# Patient Record
Sex: MALE | Race: WHITE
[De-identification: names, ages, dates, MRNs, and addresses within clinical notes are randomized per-mention and may not be internally consistent; named-entity substitution may affect disease eponyms.]

---

## 2017-02-04 NOTE — ER DOCUMENT REPORT
ED General





- General


Chief Complaint: Flu Symptoms


Stated Complaint: COUGH


Notes: 


Patient is 51-year-old male presents with complaint of coughing, chills, and 

vomiting 2.  No abdominal pain.  No diarrhea.  He says he may have had some 

sick contacts at work but is unsure.  No chest pain.  Mild sore throat.  No ear 

pain.


TRAVEL OUTSIDE OF THE [place] IN LAST 30 DAYS: No





- Related Data


Allergies/Adverse Reactions: 


 





No Known Allergies Allergy (Verified 10/04/16 17:00)


 











Past Medical History





- Social History


Smoking Status: Unknown if Ever Smoked


Frequency of alcohol use: None


Drug Abuse: None


Family History: Reviewed & Not Pertinent


Renal/ Medical History: Denies: Hx Peritoneal Dialysis


Past Surgical History: Reports: Hx Cardiac Surgery, Hx Cholecystectomy, Hx 

Orthopedic Surgery - right wrist





- Immunizations


Immunizations up to date: Yes


Hx Diphtheria, Pertussis, Tetanus Vaccination: Yes





Review of Systems





- Review of Systems


Notes: 


My Normal Review Basic





REVIEW OF SYSTEMS:


CONSTITUTIONAL : Chills and body aches


EENT:   Denies eye, ear, throat, or mouth pain or symptoms.  Denies nasal or 

sinus congestion.


CARDIOVASCULAR:  Denies chest pain.


RESPIRATORY: Recurrent cough.


GASTROINTESTINAL:  Denies abdominal pain.  Vomiting twice.  Denies 

constipation.  Last BM: 


MUSCULOSKELETAL:  Denies neck or back pain or joint pain or swelling.


SKIN:   Denies rash or skin lesions.ds.


NEUROLOGICAL:  Denies altered mental status or loss of consciousness.  Denies 

headache.  Denies weakness or paralysis or loss of use of either side.  Denies 

problems with gait or speech.  Denies sensory or motor loss.


ALL OTHER SYSTEMS REVIEWED AND NEGATIVE.





Physical Exam





- Vital signs


Vitals: 


 











Temp Resp BP Pulse Ox


 


 98.2 F   16   131/79 H  95 


 


 02/04/17 20:55  02/04/17 20:55  02/04/17 20:55  02/04/17 20:55














- Notes


Notes: 


General Appearance: Well nourished, alert, cooperative, no acute distress, no 

obvious discomfort.  Well-appearing.


Vitals: reviewed, See vital signs table.


Head: no swelling or tenderness to the head


Eyes: PERRL, EOMI, Conjuctiva clear


Mouth: No decreasd moisture


Throat: No tonsillar inflammation, No airway obstruction,  No lymphadenopathy


Ears: Normal appearing tympanic membranes bilaterally.


Neck: Supple, no neck tenderness, No thyromegaly


Lungs: No wheezing, No rales, No rhonci, No accessory muscle use, good air 

exchange bilaterally.


Heart: Normal rate, Regular rythm, No murmur, no rub


Abdomen: Normal BS, soft, No rigidity, No abdominal tenderness, No guarding, no 

rebound, no abdominal masses, no organomegaly


Extremities: strength 5/5 in all extremities, good pulses in all extremities, 

no swelling or tenderness in the extremities, no edema.


Skin: warm, dry, appropriate color, no rash


Neuro: speech clear, oriented x 3, normal affect, responds appropriately to 

questions.





Course





- Vital Signs


Vital signs: 


 











Temp Pulse Resp BP Pulse Ox


 


 97.6 F   58 L  16   127/78 H  97 


 


 02/05/17 02:35  02/05/17 02:35  02/05/17 02:35  02/05/17 02:35  02/05/17 02:35














- Transfer of Care


Notes: 





02/05/17 07:02


Chest x-ray is negative for pneumonia.  Patient looks very well.  A fairly safe 

to be discharged home.  I'll discharge him home with Zofran for his nausea.  I 

encouraged return to ER immediately if he has difficulty breathing, high fevers

, or appears unwell.





Dictation of this chart was performed using voice recognition software; 

therefore, there may be some unintended grammatical errors.





Discharge





- Discharge


Clinical Impression: 


URI (upper respiratory infection)


Qualifiers:


 URI type: unspecified URI Qualified Code(s): J06.9 - Acute upper respiratory 

infection, unspecified





Condition: Good


Disposition: HOME, SELF-CARE


Additional Instructions: 


Chest x-ray was negative for pneumonia.  I prescribed a nausea medicine in case 

you have recurrent nausea or any vomiting.  I suspect that your illness is 

probably related to a virus.  Despite you having a negative chest x-ray today, 

is still extremely important to return to ER immediately if you have difficulty 

breathing, worsening cough, or recurrent fevers.  Please follow-up with your 

doctor to 3 days for reevaluation.  If unable to follow up with her doctor you'

re more than welcome to return here for reevaluation.


Forms:  Return to Work

## 2017-02-04 NOTE — ER DOCUMENT REPORT
ED Medical Screen (RME)





- General


Stated Complaint: COUGH


Notes: 


Patient states he has been coughing for 2 days.  States he vomited today 2, no 

diarrhea.  States he has chills.  Vital signs normal in RME.  Pain level 4 due 

to sore throat.





I have greeted and performed a rapid initial assessment of this patient.  A 

comprehensive ED assessment and evaluation of the patient, analysis of test 

results and completion of the medical decision making process will be conducted 

by additional ED providers.


TRAVEL OUTSIDE OF THE [place] IN LAST 30 DAYS: No





- Related Data


Allergies/Adverse Reactions: 


 





No Known Allergies Allergy (Verified 10/04/16 17:00)


 











Past Medical History


Past Surgical History: Reports: Hx Cardiac Surgery, Hx Cholecystectomy, Hx 

Orthopedic Surgery - right wrist





- Immunizations


Immunizations up to date: Yes


Hx Diphtheria, Pertussis, Tetanus Vaccination: Yes





Physical Exam





- Vital signs


Vitals: 





 











Temp Resp BP Pulse Ox


 


 98.2 F   16   131/79 H  95 


 


 02/04/17 20:55  02/04/17 20:55  02/04/17 20:55  02/04/17 20:55














Course





- Vital Signs


Vital signs: 





 











Temp Pulse Resp BP Pulse Ox


 


 98.2 F      16   131/79 H  95 


 


 02/04/17 20:55     02/04/17 20:55  02/04/17 20:55  02/04/17 20:55

## 2017-12-13 ENCOUNTER — HOSPITAL ENCOUNTER (EMERGENCY)
Dept: HOSPITAL 62 - ER | Age: 52
Discharge: HOME | End: 2017-12-13
Payer: COMMERCIAL

## 2017-12-13 VITALS — SYSTOLIC BLOOD PRESSURE: 128 MMHG | DIASTOLIC BLOOD PRESSURE: 79 MMHG

## 2017-12-13 DIAGNOSIS — R50.9: ICD-10-CM

## 2017-12-13 DIAGNOSIS — R05: Primary | ICD-10-CM

## 2017-12-13 PROCEDURE — 71020: CPT

## 2017-12-13 PROCEDURE — 87804 INFLUENZA ASSAY W/OPTIC: CPT

## 2017-12-13 PROCEDURE — 94640 AIRWAY INHALATION TREATMENT: CPT

## 2017-12-13 PROCEDURE — 99283 EMERGENCY DEPT VISIT LOW MDM: CPT

## 2017-12-13 NOTE — ER DOCUMENT REPORT
HPI





- HPI


Pain Level: 3


Context: 





Patient is a 52-year-old male who presents emergency department complaining of 

flulike symptoms for the past days.  Admits to a T-max of 100 that was noted 

yesterday.  Otherwise denies any nausea, vomiting, abdominal pain diarrhea or 

constipation.  Main complaint is body aches with productive cough.  Patient is 

a non-smoker otherwise healthy male.  Does not have a primary care doctor





- CONSTITUTIONAL


Constitutional: REPORTS: Fever, Chills





- EENT


EENT: REPORTS: Sore Throat, Ear Pain.  DENIES: Eye problems





- CARDIOVASCULAR


Cardiovascular: REPORTS: Chest pain





- RESPIRATORY


Respiratory: REPORTS: Coughing - productive white sputum.  DENIES: Trouble 

Breathing





- REPRODUCTIVE


Reproductive: DENIES: Pregnant:





Past Medical History





- Social History


Smoking Status: Never Smoker


Chew tobacco use (# tins/day): No


Frequency of alcohol use: None


Drug Abuse: None


Family History: Reviewed & Not Pertinent


Patient has suicidal ideation: No


Patient has homicidal ideation: No


Renal/ Medical History: Denies: Hx Peritoneal Dialysis


Past Surgical History: Reports: Hx Cardiac Surgery, Hx Cholecystectomy, Hx 

Orthopedic Surgery - right wrist





- Immunizations


Immunizations up to date: Yes


Hx Diphtheria, Pertussis, Tetanus Vaccination: Yes





Vertical Provider Document





- CONSTITUTIONAL


Agree With Documented VS: Yes


Notes: 





PHYSICAL EXAM


GENERAL: Alert, interacts well. 


HEAD: Normocephalic, atraumatic.


EYES: Pupils equal, round, and reactive to light. Extraocular movements intact.


ENT: Oral mucosa moist, tongue midline. 


NECK: Full range of motion. Supple. Trachea midline.


LUNGS: Diminished breath sounds in the right lower lobe with audible crackles 

without wheezes, rales, or rhonchi. No respiratory distress.


HEART: Regular rate and rhythm. No murmurs, gallops, or rubs.


ABDOMEN: Soft,  nondistended, nontender. No guarding, rebound, or rigidity.. 

Bowel sounds present in all 4 quadrants.


EXTREMITIES: Moves all 4 extremities spontaneously. No edema, radial and 

dorsalis pedis pulses 2/4 bilaterally. No cyanosis. 


NEUROLOGICAL: Alert and oriented x4. Normal speech.


PSYCH: Normal affect, normal mood.


SKIN: Warm, dry, normal turgor. No rashes or lesions noted.








- INFECTION CONTROL


TRAVEL OUTSIDE OF THE [place] IN LAST 30 DAYS: No





- RESPIRATORY


O2 Sat by Pulse Oximetry: 97





Course





- Re-evaluation


Re-evalutation: 





12/13/17 10:14


Patient is a 52-year-old male who is hemodynamically stable, no acute distress 

and afebrile.  Patient states he feels much better after breathing treatment.  

Chest x-ray without any evidence of pneumonia.  Presentation is most consistent 

with a viral upper respiratory infection.  Patient is overall well appearance, 

vitals within normal limits, well-hydrated.  Patient denies any headache, neck 

pain, and has no evidence of meningismus on examination.  Lungs are clear 

bilaterally.  No evidence of respiratory distress.  Based on clinical exam and 

history, I do not suspect an acute pneumonia, meningitis, strep pharyngitis, or 

an acute encephalitis.  No laboratory or imaging testing is indicated at this 

time.  Will discharge patient with return precautions and followup 

recommendations.  They are in agreement this plan have verbalized understanding 

return precautions.








- Vital Signs


Vital signs: 


 











Temp Pulse Resp BP Pulse Ox


 


 98.8 F   74   14   124/77   97 


 


 12/13/17 07:41  12/13/17 07:41  12/13/17 07:41  12/13/17 07:41  12/13/17 07:41














- Diagnostic Test


Radiology reviewed: Image reviewed, Reports reviewed





Discharge





- Discharge


Clinical Impression: 


 Cough





Condition: Good


Disposition: HOME, SELF-CARE


Additional Instructions: 


Your symptoms are most likely due to a viral infection it should resolve over 

the next 7-14 days.  You should take over-the-counter guanfacine per bottle 

instructions to help thin the mucus. For nasal congestion: I would recommend 

that you get over-the-counter oxymetazoline also known is afrin.  Use only per 

bottle instructions and be sure to never use this for more than 3 days if you 

can develop severe rebound congestion. You may also use tylenol or ibuprofen as 

needed for aches and throat discomfort. Please be sure to drink plenty of 

fluids and get rest. Return to the emergency department he began having 

difficulty breathing, chest pain, persistent vomiting, or any other symptoms 

that are concerning to you. 


Prescriptions: 


Albuterol Sulfate [Proair HFA Inhalation Aerosol 8.5 gm MDI] 2 puff IH Q4H PRN #

1 mdi


 PRN Reason: 


Forms:  Return to Work

## 2017-12-13 NOTE — RADIOLOGY REPORT (SQ)
EXAM DESCRIPTION:  CHEST PA/LAT



COMPLETED DATE/TIME:  12/13/2017 9:41 am



REASON FOR STUDY:  diminished BS RLL with crackles, cough



COMPARISON:  2/5/2017



EXAM PARAMETERS:  NUMBER OF VIEWS: two views

TECHNIQUE: Digital Frontal and Lateral radiographic views of the chest acquired.

RADIATION DOSE: NA

LIMITATIONS: none



FINDINGS:  LUNGS AND PLEURA: Subsegmental atelectasis or scarring in the left lower lobe.  Right lung
 is clear.  No effusions.

MEDIASTINUM AND HILAR STRUCTURES: No masses or contour abnormalities.

HEART AND VASCULAR STRUCTURES: Heart normal size.  No evidence for failure.

BONES: No acute findings.

HARDWARE: None in the chest.

OTHER: No other significant finding.



IMPRESSION:  Left basilar atelectasis.



TECHNICAL DOCUMENTATION:  JOB ID:  [id number]

 2011 [employer]- All Rights Reserved

## 2017-12-14 ENCOUNTER — HOSPITAL ENCOUNTER (EMERGENCY)
Dept: HOSPITAL 62 - ER | Age: 52
Discharge: HOME | End: 2017-12-14
Payer: COMMERCIAL

## 2017-12-14 VITALS — DIASTOLIC BLOOD PRESSURE: 81 MMHG | SYSTOLIC BLOOD PRESSURE: 133 MMHG

## 2017-12-14 DIAGNOSIS — T48.6X6A: ICD-10-CM

## 2017-12-14 DIAGNOSIS — R06.2: ICD-10-CM

## 2017-12-14 DIAGNOSIS — Z91.14: ICD-10-CM

## 2017-12-14 DIAGNOSIS — Z91.128: ICD-10-CM

## 2017-12-14 DIAGNOSIS — J06.9: Primary | ICD-10-CM

## 2017-12-14 DIAGNOSIS — R05: ICD-10-CM

## 2017-12-14 PROCEDURE — 99284 EMERGENCY DEPT VISIT MOD MDM: CPT

## 2017-12-14 PROCEDURE — 94640 AIRWAY INHALATION TREATMENT: CPT

## 2017-12-14 PROCEDURE — 71020: CPT

## 2017-12-14 RX ADMIN — ALBUTEROL SULFATE SCH MG: 2.5 SOLUTION RESPIRATORY (INHALATION) at 22:32

## 2017-12-14 RX ADMIN — ALBUTEROL SULFATE SCH MG: 2.5 SOLUTION RESPIRATORY (INHALATION) at 22:06

## 2017-12-14 NOTE — ER DOCUMENT REPORT
HPI





- HPI


Patient complains to provider of: cough, wheezinge


Onset: Other - 4 days


Onset/Duration: Worse


Quality of pain: Other - Tightness


Pain Level: 3


Context: 





Patient complains of cough, wheezing for the past few days.  Patient states he 

had a fever yesterday but has not measured 1 today.  Patient was seen in the 

emergency department yesterday for this complaint and given a prescription for 

an inhaler.  Patient states that he did not make it to the pharmacy to get the 

prescription filled.  Patient states he started having worse wheezing at home 

which prompted his visit tonight.  Patient denies any history of asthma or 

smoking.


Associated Symptoms: Nonproductive cough.  denies: Chest pain, Fever


Exacerbated by: Denies


Relieved by: Denies


Similar symptoms previously: Yes


Recently seen / treated by doctor: Yes





- ROS


ROS below otherwise negative: Yes


Systems Reviewed and Negative: Yes All other systems reviewed and negative





- CONSTITUTIONAL


Constitutional: DENIES: Fever, Chills





- EENT


EENT: DENIES: Sore Throat





- RESPIRATORY


Respiratory: REPORTS: Coughing





- GASTROINTESTINAL


Gastrointestinal: DENIES: Nausea, Patient vomiting, Diarrhea





- REPRODUCTIVE


Reproductive: DENIES: Pregnant:





- MUSCULOSKELETAL


Musculoskeletal: DENIES: Back Pain





- DERM


Skin Color: Normal


Skin Problems: None





Past Medical History





- General


Information source: Patient





- Social History


Smoking Status: Never Smoker


Frequency of alcohol use: None


Drug Abuse: None


Occupation: Foodservice


Family History: Reviewed & Not Pertinent


Patient has suicidal ideation: No


Patient has homicidal ideation: No





- Medical History


Medical History: Negative





- Past Medical History


Cardiac Medical History: 


   Denies: Hx Coronary Artery Disease


Pulmonary Medical History: 


   Denies: Hx Asthma


Endocrine Medical History: Denies: Hx Diabetes Mellitus Type 1, Hx Diabetes 

Mellitus Type 2


Renal/ Medical History: Denies: Hx Peritoneal Dialysis


Past Surgical History: Reports: Hx Cholecystectomy, Hx Orthopedic Surgery - 

right wrist





- Immunizations


Immunizations up to date: Yes


Hx Diphtheria, Pertussis, Tetanus Vaccination: Yes





Vertical Provider Document





- CONSTITUTIONAL


Agree With Documented VS: Yes


Exam Limitations: No Limitations


General Appearance: WD/WN, No Apparent Distress





- INFECTION CONTROL


TRAVEL OUTSIDE OF THE [place] IN LAST 30 DAYS: No





- HEENT


HEENT: Atraumatic, Normal ENT Exam, Normocephalic





- NECK


Neck: Normal Inspection, Supple.  negative: Lymphadenopathy-Left, 

Lymphadenopathy-Right





- RESPIRATORY


Respiratory: No Respiratory Distress, Chest Non-Tender, Wheezing


O2 Sat by Pulse Oximetry: 97





- CARDIOVASCULAR


Cardiovascular: Regular Rate, Regular Rhythm, No Murmur





- BACK


Back: Normal Inspection





- MUSCULOSKELETAL/EXTREMETIES


Musculoskeletal/Extremeties: MAEW, FROM, Non-Tender





- NEURO


Level of Consciousness: Awake, Alert, Appropriate


Motor/Sensory: No Motor Deficit





- DERM


Integumentary: Warm, Dry, No Rash





Course





- Re-evaluation


Re-evalutation: 





12/14/17 


Patient with improved air movement and decreased wheezing bilaterally.  

Respirations even and unlabored.  Patient stable for discharge.  Patient 

advised that he will need to get his prescription steroid medication filled as 

prescribed.  Patient will be given an inhaler tonight to ensure compliance with 

use of bronchodilator medication at home.





- Vital Signs


Vital signs: 


 











Temp Pulse Resp BP Pulse Ox


 


 99.3 F   87   18   133/81 H  97 


 


 12/14/17 20:59  12/14/17 20:59  12/14/17 20:59  12/14/17 20:59  12/14/17 20:59














- Diagnostic Test


Radiology reviewed: Reports reviewed - Reviewed patient's x-ray report from 

previous ER visit





Discharge





- Discharge


Clinical Impression: 


 Cough, Wheezing





Upper respiratory infection


Qualifiers:


 URI type: unspecified URI Qualified Code(s): J06.9 - Acute upper respiratory 

infection, unspecified





Condition: Stable


Disposition: HOME, SELF-CARE


Additional Instructions: 


Return immediately for any new or worsening symptoms





Followup with your primary care provider, call tomorrow to make a followup 

appointment





UPPER RESPIRATORY ILLNESS:





     You have a viral infection of the respiratory passages -- a "cold."  This 

common infection causes nasal congestion, drainage, and often sore throat and 

cough.  It is highly contagious.  The disease usually lasts about 10 to 14 days.


     There is no "cure" for the viral infection -- it must run its course.  If 

there is a complication, such as bacterial infection in the nose, sinuses, 

middle ear, or bronchial tubes, antibiotics may be required.  The antibiotics 

won't affect the virus.


     Drink plenty of fluids.  A humidifier may help.  An expectorant medication 

or decongestant may make you more comfortable.  Use acetaminophen or ibuprofen 

for fever or aches.


     See the doctor if fever persists over two days, if there is any 

significant worsening of your symptoms, or if you simply fail to improve as 

expected.








BRONCHOSPASM:





     You have tightness in the bronchial tubes, called bronchospasm. This often 

occurs with bronchial infections.  Allergies, inhaled chemicals, and polluted 

or cold air can also provoke bronchospasm. It's more likely in patients with 

asthma in the family.


     Emergency treatment of bronchospasm may include adrenaline shots or 

bronchodilator aerosol.  You may feel lightheaded and have a rapid pulse for an 

hour or two.  Rest and get plenty of fluids.


     At home, we'll treat you with a bronchodilator inhaler. Antibiotics and 

corticosteroids may be required for some patients. Until you recover, avoid 

chemical fumes, dusts, pollens, and exercising in very cold or dry air. If you 

smoke, stop now!!


     If you develop a fever, increased wheezing, chest pain, or severe 

shortness of breath, you should contact the doctor immediately.








INHALED BRONCHODILATORS:


     You have received a treatment of and/or prescription for an inhaled 

bronchodilator -- a medication which stimulates the airways in the lung to 

dilate.  This improves the flow of air in asthma, bronchitis, and emphysema.


     These medicines have some similarity to adrenaline, and can cause similar 

side effects:  shakiness, racing heart, and a sense of nervousness.  These side 

effects decrease with time.  Contact your doctor if these side effects are 

severe.


     Do not over-use the medicine.  Too-frequent use of the inhaler may make it 

ineffective.  Call your doctor if the inhaler is not controlling your symptoms 

at the prescribed doses.








STEROID MEDICATION:


     You have been given an injection of or oral medicine of the cortisone/

steroid class.  This medication is used to control inflammation or allergy.  

Basilio t is usually only given for a short period of time, until the acute process 

subsides.


     There are usually no side effects from short-term use of cortisone-like 

medications.  Some persons feel an increased sense of well-being and are not 

sleepy at bedtime.  Long-term use of cortisone medications is best avoided, 

unless required for a severe condition.  If your condition does not remit, or 

relapses after the course of corticosteroid medication, you should consult your 

physician.








USE OF ACETAMINOPHEN (Tylenol):


     Acetaminophen may be taken for pain relief or fever control. It's much 

safer than aspirin, offering a wider range of "safe" dosages.  It is safe 

during pregnancy.  Some brand names are Tylenol, Panadol, Datril, Anacin 3, 

Tempra, and Liquiprin. Acetaminophen can be repeated every four hours.  The 

following are maximum recommended dosages:


>89 pounds or adults          650 mg to 900 mg


Acetaminophen can be repeated every four hours.  Maximum dose not to exceed 

4000 mg a day.








FOLLOW-UP CARE:


If you have been referred to a physician for follow-up care, call the physician

s office for an appointment as you were instructed or within the next two days.

  If you experience worsening or a significant change in your symptoms, notify 

the physician immediately or return to the Emergency Department at any time for 

re-evaluation.





Prescriptions: 


Benzonatate [Tessalon Perle 100 mg Capsule] 100 mg PO Q8HP PRN #20 cap


 PRN Reason: 


Prednisone [Deltasone 20 mg Tablet] 3 tab PO DAILY 4 Days  tablet


Forms:  Return to Work


Referrals: 


[facility] [Provider Group] - Follow up as needed


[facility] [Provider Group] - Follow up as needed

## 2017-12-14 NOTE — RADIOLOGY REPORT (SQ)
EXAM DESCRIPTION:  CHEST PA/LAT



COMPLETED DATE/TIME:  12/14/2017 9:59 pm



REASON FOR STUDY:  cough



COMPARISON:  None.



EXAM PARAMETERS:  NUMBER OF VIEWS: two views

TECHNIQUE: Digital Frontal and Lateral radiographic views of the chest acquired.

RADIATION DOSE: NA

LIMITATIONS: none



FINDINGS:  LUNGS AND PLEURA: No opacities, masses or pneumothorax. No pleural effusion.

MEDIASTINUM AND HILAR STRUCTURES: No masses or contour abnormalities.

HEART AND VASCULAR STRUCTURES: Heart normal size.  No evidence for failure.

BONES: No acute findings.

HARDWARE: None in the chest.

OTHER: No other significant finding.



IMPRESSION:  NO SIGNIFICANT RADIOGRAPHIC FINDING IN THE CHEST.



TECHNICAL DOCUMENTATION:  JOB ID:  [id number]

 2011 [employer]- All Rights Reserved

## 2018-08-17 ENCOUNTER — HOSPITAL ENCOUNTER (EMERGENCY)
Dept: HOSPITAL 62 - ER | Age: 53
Discharge: HOME | End: 2018-08-17
Payer: COMMERCIAL

## 2018-08-17 VITALS — DIASTOLIC BLOOD PRESSURE: 83 MMHG | SYSTOLIC BLOOD PRESSURE: 141 MMHG

## 2018-08-17 DIAGNOSIS — R42: ICD-10-CM

## 2018-08-17 DIAGNOSIS — M79.1: ICD-10-CM

## 2018-08-17 DIAGNOSIS — R05: ICD-10-CM

## 2018-08-17 DIAGNOSIS — R50.9: ICD-10-CM

## 2018-08-17 DIAGNOSIS — R51: ICD-10-CM

## 2018-08-17 DIAGNOSIS — R09.82: ICD-10-CM

## 2018-08-17 DIAGNOSIS — J06.9: Primary | ICD-10-CM

## 2018-08-17 PROCEDURE — 99283 EMERGENCY DEPT VISIT LOW MDM: CPT

## 2018-08-17 NOTE — ER DOCUMENT REPORT
HPI





- HPI


Patient complains to provider of: cough and cold body aches


Onset: Other - since sunday


Onset/Duration: Intermittent


Quality of pain: Achy


Pain Level: 3


Associated Symptoms: Body/muscle aches, Chills, Nonproductive cough, Fever, 

Headache, Rhinnorhea, Sinus pain/drainage


Exacerbated by: Denies


Relieved by: Denies


Similar symptoms previously: Yes


Recently seen / treated by doctor: No





- ROS


ROS below otherwise negative: Yes





- CONSTITUTIONAL


Constitutional: REPORTS: Fever, Chills





- EENT


EENT: REPORTS: Nasal Drainage-Purulent, Congestion.  DENIES: Sore Throat, Ear 

Pain, Nasal Drainage-Clear, Eye problems





- NEURO


Neurology: REPORTS: Headache, Dizzinesss / Vertigo.  DENIES: Weakness, Vision 

blurred





- CARDIOVASCULAR


Cardiovascular: DENIES: Chest pain





- RESPIRATORY


Respiratory: REPORTS: Coughing.  DENIES: Trouble Breathing





- GASTROINTESTINAL


Gastrointestinal: DENIES: Abdominal Pain, Nausea, Patient vomiting, Diarrhea, 

Constipation, Black / Bloody Stools





- URINARY


Urinary: DENIES: Dysuria, Urgency, Frequency





- REPRODUCTIVE


Reproductive: DENIES: Pregnant:, Postmenopausal, Abnormal bleeding / discharge





- MUSCULOSKELETAL


Musculoskeletal: DENIES: Extremity pain - Body aches, Back Pain - Body aches, 

Neck Pain, Swelling





- DERM


Skin Color: Normal


Skin Problems: None





Past Medical History





- General


Information source: Patient





- Social History


Smoking Status: Never Smoker


Cigarette use (# per day): No


Chew tobacco use (# tins/day): No


Smoking Education Provided: No


Frequency of alcohol use: None


Drug Abuse: None


Lives with: Family


Family History: Reviewed & Not Pertinent


Patient has suicidal ideation: No


Patient has homicidal ideation: No





- Past Medical History


Cardiac Medical History: Reports: None


Pulmonary Medical History: Reports: None, Hx Bronchitis


EENT Medical History: Reports: None


Neurological Medical History: Reports: None


Endocrine Medical History: Reports: None


Renal/ Medical History: Reports: None


Malignancy Medical History: Reports None


GI Medical History: Reports: None


Musculoskeletal Medical History: Reports None


Skin Medical History: Reports None


Psychiatric Medical History: Reports: None


Traumatic Medical History: Reports: None


Infectious Medical History: Reports: None


Past Surgical History: Reports: Hx Cardiac Surgery, Hx Cholecystectomy, Hx 

Orthopedic Surgery - right wrist





- Immunizations


Immunizations up to date: Yes


Hx Diphtheria, Pertussis, Tetanus Vaccination: Yes





Vertical Provider Document





- CONSTITUTIONAL


Agree With Documented VS: Yes


Exam Limitations: No Limitations


General Appearance: No Apparent Distress





- INFECTION CONTROL


TRAVEL OUTSIDE OF THE [place] IN LAST 30 DAYS: No





- HEENT


HEENT: Atraumatic, Normocephalic, PERRLA


Notes: 





Sinus swollen with purulent nasal drainage postnasal drip edematous oropharynx 

no swelling to tonsils no peritonsillar abscess.





- NECK


Neck: Normal Inspection





- RESPIRATORY


Respiratory: Breath Sounds Normal, No Respiratory Distress.  negative: Chest Non

-Tender, Rales, Rhonchi, Wheezing, Other





- CARDIOVASCULAR


Cardiovascular: Regular Rate, Regular Rhythm





- BACK


Back: Normal Inspection





- MUSCULOSKELETAL/EXTREMETIES


Musculoskeletal/Extremeties: MAEW, FROM, Non-Tender





- NEURO


Level of Consciousness: Awake, Alert, Appropriate


Motor/Sensory: No Motor Deficit, No Sensory Deficit





- DERM


Integumentary: Warm, Dry, No Rash





Course





- Re-evaluation


Re-evalutation: 





08/17/18 11:30


Pain consistent with an upper respiratory infection.  After performing a 

Medical Screening Examination, I estimate there is LOW risk for ACUTE CORONARY 

SYNDROME, RESPIRATORY FAILURE, SEPSIS OR MENINGITIS, thus I consider the 

discharge disposition reasonable.  I have reevaluated this patient multiple 

times and no significant life threatening changes are noted. The patient and I 

have discussed the diagnosis and risks, and we agree with discharging home with 

close follow-up. We also discussed returning to the Emergency Department 

immediately if new or worsening symptoms occur. We have discussed the symptoms 

which are most concerning (e.g., changing or worsening pain, trouble swallowing 

or breathing, neck stiffness, fever) that necessitate immediate return.





Discharge





- Discharge


Clinical Impression: 


URI (upper respiratory infection)


Qualifiers:


 URI type: unspecified URI Qualified Code(s): J06.9 - Acute upper respiratory 

infection, unspecified





Condition: Stable


Disposition: HOME, SELF-CARE


Instructions:  Family Physicians / Practices


Additional Instructions: 


UPPER RESPIRATORY ILLNESS:





     You have a viral infection of the respiratory passages -- a "cold."  This 

common infection causes nasal congestion, drainage, and often sore throat and 

cough.  It is highly contagious.  The disease usually lasts about 10 to 14 days.


     There is no "cure" for the viral infection -- it must run its course.  If 

there is a complication, such as bacterial infection in the nose, sinuses, 

middle ear, or bronchial tubes, antibiotics may be required.  The antibiotics 

won't affect the virus.


     Drink plenty of fluids.  A humidifier may help.  An expectorant medication 

or decongestant may make you more comfortable.  Use acetaminophen or ibuprofen 

for fever or aches.


     See the doctor if fever persists over two days, if there is any 

significant worsening of your symptoms, or if you simply fail to improve as 

expected.








DECONGESTANT MEDICATION:


     A decongestant medicine has been.  Often this medicine is combined in the 

same tablet with an antihistamine or expectorant. This type of medicine is 

helpful in treating a bad cold or sinus condition, as well as in treatment of 

the nasal congestion of hay fever.  It is not of much benefit for lung 

infections.


     Decongestant medicines are related to stimulants.  They can cause an 

increase in blood pressure and heart rate.  Persons with heart disease and high 

blood pressure should not take decongestants without discussing this with the 

physician.


     If you develop palpitations, chest pain, headache, or tremors, stop the 

medicine and consult your physician.





COUGH-SUPPRESSANT & EXPECTORANT MEDICATION:


     You are to use a cough medication as needed for relief of symptoms.  This 

medicine is a combination of an expectorant (to make the mucous thinner and 

more easily "coughed up") and a cough suppressant (to reduce the frequency of 

coughing).


     The cough-suppressant medicine is related to narcotics.  You may 

experience mild nausea and sleepiness.  Some patients who are very sensitive to 

narcotics may have stomach pain from this medicine. Taking the medicine with 

food reduces these side effects.  Do not drive or work with machinery until you 

know how this medicine affects you.


     The expectorant should have no side effects.  Iodine-containing 

expectorants (such as organidin) should not be taken by persons with active 

thyroid disease unless approved by your doctor.


     Call the doctor if you develop shortness of breath, hives, rash, itching, 

lightheadedness, or severe nausea and vomiting.








USE OF ACETAMINOPHEN (Tylenol):


     Acetaminophen may be taken for pain relief or fever control. It's much 

safer than aspirin, offering a wider range of "safe" dosages.  It is safe 

during pregnancy.  Some brand names are Tylenol, Panadol, Datril, Anacin 3, 

Tempra, and Liquiprin. Acetaminophen can be repeated every four hours.  The 

following are maximum recommended dosages:


>89 pounds or adults          650 mg to 900 mg


Acetaminophen can be repeated every four hours.  Maximum dose not to exceed 

4000 mg a day.








You have been given Claritin 10 mg, Sudafed 30 mg, Mucinex 600 mg, and 

ibuprofen 800 mg today for your cough and cold and body aches.  These are all 

over-the-counter medications to Sudafed you will have to get from the 

pharmacist.  He can also use salt and soda solution to gargle which will help 

with your postnasal drip.  You can also use Flonase nasal spray use according 

to the package.





Salt and soda solution


1 quart of water


1 tablespoon of salt


1 teaspoon of baking soda


Mixed 3 ingredients together and boil for 1 minute


Placed in a covered quart jar


Use 1/2 ounce of cold solution to gargle 3 times a day





FOLLOW-UP CARE:


If you have been referred to a physician for follow-up care, call the physician

s office for an appointment as you were instructed or within the next two days.

  If you experience worsening or a significant change in your symptoms, notify 

the physician immediately or return to the Emergency Department at any time for 

re-evaluation.





Forms:  Elevated Blood Pressure, Return to Work


Referrals: 


PERRI DAHL, DAVIDP-C [Primary Care Provider] - Follow up as needed

## 2019-09-25 ENCOUNTER — HOSPITAL ENCOUNTER (EMERGENCY)
Dept: HOSPITAL 62 - ER | Age: 54
Discharge: HOME | End: 2019-09-25
Payer: COMMERCIAL

## 2019-09-25 VITALS — SYSTOLIC BLOOD PRESSURE: 130 MMHG | DIASTOLIC BLOOD PRESSURE: 80 MMHG

## 2019-09-25 DIAGNOSIS — K08.89: Primary | ICD-10-CM

## 2019-09-25 DIAGNOSIS — Z87.891: ICD-10-CM

## 2019-09-25 PROCEDURE — 99282 EMERGENCY DEPT VISIT SF MDM: CPT

## 2019-09-25 NOTE — ER DOCUMENT REPORT
HPI





- HPI


Patient complains to provider of: dental pain


Time Seen by Provider: 09/25/19 19:50


Onset: Last week


Onset/Duration: Sudden, Persistent


Quality of pain: Achy


Severity: Severe


Pain Level: 5


Context: 





This 54-year-old male presents emergency department with complaints of lower 

right-sided dental pain for 1 week.  Reports he has appointment with Dr. Norton 

but not until October 8.  He reports pain.  Reports pain with hot or cold foods.

 Denies fever vomiting diarrhea.  Patient has clear voice no obvious abscess 

noted.


Associated Symptoms: None


Exacerbated by: Food - hot/cold


Relieved by: Denies


Similar symptoms previously: No


Recently seen / treated by doctor: No





- REPRODUCTIVE


Reproductive: DENIES: Pregnant:





Past Medical History





- General


Information source: Patient





- Social History


Smoking Status: Former Smoker


Cigarette use (# per day): No


Family History: Reviewed & Not Pertinent


Patient has suicidal ideation: No


Patient has homicidal ideation: No





- Past Medical History


Cardiac Medical History: 


   Denies: Hx Coronary Artery Disease


Pulmonary Medical History: Reports: Hx Bronchitis


   Denies: Hx Asthma


Endocrine Medical History: Denies: Hx Diabetes Mellitus Type 1, Hx Diabetes 

Mellitus Type 2


Renal/ Medical History: Denies: Hx Peritoneal Dialysis


Past Surgical History: Reports: Hx Cardiac Surgery, Hx Cholecystectomy, Hx 

Orthopedic Surgery - right wrist





- Immunizations


Immunizations up to date: Yes


Hx Diphtheria, Pertussis, Tetanus Vaccination: Yes





Vertical Provider Document





- CONSTITUTIONAL


Agree With Documented VS: Yes


Exam Limitations: No Limitations


General Appearance: WD/WN, No Apparent Distress





- INFECTION CONTROL


TRAVEL OUTSIDE OF THE [place] IN LAST 30 DAYS: No





- HEENT


HEENT: Atraumatic, Normocephalic.  negative: Pharyngeal Erythema


Mouth Diagram: 


                            __________________________














                            __________________________





 1 - Complaints of tooth pain.  Erythema noted dental cavity noted no pustule  

opens mouth wide, clear voice, no trismus, no ludwigs








- NECK


Neck: Normal Inspection, Supple.  negative: Lymphadenopathy-Left, 

Lymphadenopathy-Right





- RESPIRATORY


Respiratory: No Respiratory Distress





- CARDIOVASCULAR


Cardiovascular: Regular Rate





- MUSCULOSKELETAL/EXTREMETIES


Musculoskeletal/Extremeties: MAEW, FROM





- NEURO


Level of Consciousness: Awake, Alert, Appropriate


Motor/Sensory: No Motor Deficit





- DERM


Integumentary: Warm, Dry





Course





- Re-evaluation


Re-evalutation: 





09/25/19 19:56


54-year-old male with reports of dental pain for the past week.  He does have an

appointment with a dentist.  Will prescribe him Pen-Vee K and he was instructed 

on ibuprofen or Tylenol for the pain.  He verbalized understanding to all 

instructions.














Dictation of this chart was performed using voice recognition software; 

therefore, there may be some unintended grammatical errors.





- Vital Signs


Vital signs: 


                                        











Temp Pulse Resp BP Pulse Ox


 


 98.7 F   72   20   130/80 H  96 


 


 09/25/19 19:10  09/25/19 19:10  09/25/19 19:10  09/25/19 19:10  09/25/19 19:10














Discharge





- Discharge


Clinical Impression: 


 Pain, dental





Condition: Stable


Disposition: HOME, SELF-CARE


Instructions:  Penicillin V K ([facility]), Toothache ([facility])


Additional Instructions: 


*You have been evaluated for dental pain


*Take medications as prescribed


*Follow up with dentist as scheduled


Take Tylenol or Motrin as indicated for pain


*Return to ED for worsening condition, changes, needs


Prescriptions: 


Penicillin V Potassium [Penicillin Vk 500 mg Tablet] 500 mg PO BID #20 tablet


Referrals: 


PERRI DAHL [id number] [Primary Care Provider] - Follow up as needed

## 2020-02-19 ENCOUNTER — HOSPITAL ENCOUNTER (EMERGENCY)
Dept: HOSPITAL 62 - ER | Age: 55
Discharge: LEFT BEFORE DISCHARGE | End: 2020-02-19
Payer: COMMERCIAL

## 2020-02-19 VITALS — DIASTOLIC BLOOD PRESSURE: 83 MMHG | SYSTOLIC BLOOD PRESSURE: 152 MMHG

## 2020-02-19 DIAGNOSIS — Z53.20: ICD-10-CM

## 2020-02-19 DIAGNOSIS — R21: Primary | ICD-10-CM

## 2020-02-19 PROCEDURE — 99282 EMERGENCY DEPT VISIT SF MDM: CPT

## 2020-02-19 NOTE — ER DOCUMENT REPORT
ED Medical Screen (RME)





- General


Chief Complaint: Rash


Stated Complaint: RASH


Time Seen by Provider: 02/19/20 12:46


Primary Care Provider: 


PERRI DAHL FNP-C [Primary Care Provider] - Follow up as needed


Notes: 





54-year-old male presents with rash to bilateral hands for 3 weeks.  Patient 

states he gets this every year.  Patient states it is itchy.  Rash noted to 

bilateral dorsal hands that appear plaque-like.  Patient denies any 

tongue/throat/lip swelling or dyspnea.





I have greeted and performed a rapid initial assessment of this patient. A 

comprehensive ED assessment and evaluation of the patient, analysis of test 

results and completion of the medical decision making process with be conducted 

by additional ED providers.


TRAVEL OUTSIDE OF THE [place] IN LAST 30 DAYS: No





- Related Data


Allergies/Adverse Reactions: 


                                        





No Known Allergies Allergy (Verified 12/13/17 07:38)


   











Past Medical History





- Past Medical History


Cardiac Medical History: 


   Denies: Hx Coronary Artery Disease


Pulmonary Medical History: Reports: Hx Bronchitis


   Denies: Hx Asthma


Endocrine Medical History: Denies: Hx Diabetes Mellitus Type 1, Hx Diabetes 

Mellitus Type 2


Renal/ Medical History: Denies: Hx Peritoneal Dialysis


Past Surgical History: Reports: Hx Cardiac Surgery, Hx Cholecystectomy, Hx 

Orthopedic Surgery - right wrist





- Immunizations


Immunizations up to date: Yes


Hx Diphtheria, Pertussis, Tetanus Vaccination: Yes





Physical Exam





- Vital signs


Vitals: 





                                        











Temp Pulse Resp BP Pulse Ox


 


 98.1 F   71   18   152/83 H  97 


 


 02/19/20 12:39  02/19/20 12:39  02/19/20 12:39  02/19/20 12:39  02/19/20 12:39














Course





- Vital Signs


Vital signs: 





                                        











Temp Pulse Resp BP Pulse Ox


 


 98.1 F   71   18   152/83 H  97 


 


 02/19/20 12:39  02/19/20 12:39  02/19/20 12:39  02/19/20 12:39  02/19/20 12:39














Doctor's Discharge





- Discharge


Referrals: 


PERRI DAHL FNP-C [Primary Care Provider] - Follow up as needed

## 2020-02-25 ENCOUNTER — HOSPITAL ENCOUNTER (EMERGENCY)
Dept: HOSPITAL 62 - ER | Age: 55
Discharge: HOME | End: 2020-02-25
Payer: COMMERCIAL

## 2020-02-25 VITALS — SYSTOLIC BLOOD PRESSURE: 135 MMHG | DIASTOLIC BLOOD PRESSURE: 83 MMHG

## 2020-02-25 DIAGNOSIS — L03.119: ICD-10-CM

## 2020-02-25 DIAGNOSIS — Z90.49: ICD-10-CM

## 2020-02-25 DIAGNOSIS — L23.5: Primary | ICD-10-CM

## 2020-02-25 PROCEDURE — 99282 EMERGENCY DEPT VISIT SF MDM: CPT

## 2020-02-25 NOTE — ER DOCUMENT REPORT
HPI





- HPI


Time Seen by Provider: 02/25/20 13:56


Pain Level: Denies


Context: 





Patient is a 54-year-old male with no past medical history who presents to the 

emergency department with a chief complaint of a rash to his right hand and now 

to his left hand.  He has had this rash for the past 2 to 3 weeks.  He was seen 

here in the emergency department on February 19, but ended up leaving due to the

weight.  Patient has not been on antibiotics.  Denies any fever, body aches, or 

chills.  Patient works in [place] [employer] [place] and states that sometimes he 

does not use gloves when he uses the cleaning wipes.





- ROS


Systems Reviewed and Negative: Yes All other systems reviewed and negative





- CONSTITUTIONAL


Constitutional: DENIES: Fever, Chills





- REPRODUCTIVE


Reproductive: DENIES: Pregnant:





- DERM


Skin Color: Normal


Skin Problems: None





Past Medical History





- Social History


Smoking Status: Never Smoker


Frequency of alcohol use: None


Drug Abuse: None


Family History: Reviewed & Not Pertinent


Patient has suicidal ideation: No


Patient has homicidal ideation: No





- Past Medical History


Cardiac Medical History: 


   Denies: Hx Coronary Artery Disease


Pulmonary Medical History: Reports: Hx Bronchitis


   Denies: Hx Asthma


Endocrine Medical History: Denies: Hx Diabetes Mellitus Type 1, Hx Diabetes 

Mellitus Type 2


Renal/ Medical History: Denies: Hx Peritoneal Dialysis


Past Surgical History: Reports: Hx Cholecystectomy, Hx Orthopedic Surgery - 

right wrist.  Denies: Hx Cardiac Surgery





- Immunizations


Immunizations up to date: Yes


Hx Diphtheria, Pertussis, Tetanus Vaccination: Yes





Vertical Provider Document





- CONSTITUTIONAL


Agree With Documented VS: Yes


Exam Limitations: No Limitations


General Appearance: No Apparent Distress





- INFECTION CONTROL


TRAVEL OUTSIDE OF THE [place] IN LAST 30 DAYS: No





- HEENT


HEENT: Atraumatic, Normocephalic, PERRLA





- RESPIRATORY


Respiratory: No Respiratory Distress





- CARDIOVASCULAR


Cardiovascular: Regular Rate, Regular Rhythm


Pulses: Normal: Radial





- MUSCULOSKELETAL/EXTREMETIES


Musculoskeletal/Extremeties: FROM





- NEURO


Level of Consciousness: Awake, Alert, Appropriate


Motor/Sensory: No Motor Deficit, No Sensory Deficit





- DERM


Integumentary: Warm, Dry, Rash - Right posterior hand and left posterior hand, 

consistent with cellulitis





Course





- Re-evaluation


Re-evalutation: 





02/25/20 14:04


Patient appears to have contact dermatitis with a superimposed bacterial 

infection, most likely cellulitis.  Patient will be started on Keflex, 

prednisone, and Pepcid to help with his symptoms.  I advised him to establish a 

primary care provider and follow-up.  I have a very low suspicion for 

necrotizing fasciitis.  Follow-up precautions were given.  Verbal discharge 

instructions were given to the patient.  They verbalized understanding.  They 

are stable for discharge.














- Vital Signs


Vital signs: 


                                        











Temp Pulse Resp BP Pulse Ox


 


 98 F   71   20   146/86 H  97 


 


 02/25/20 12:55  02/25/20 12:55  02/25/20 12:55  02/25/20 12:55  02/25/20 12:55














Discharge





- Discharge


Clinical Impression: 


Contact dermatitis


Qualifiers:


 Contact dermatitis type: allergic Contact dermatitis trigger: other chemical 

product Qualified Code(s): L23.5 - Allergic contact dermatitis due to other mohamud

mical products





Cellulitis


Qualifiers:


 Site of cellulitis: extremity Site of cellulitis of extremity: upper extremity 

Laterality: unspecified laterality Qualified Code(s): L03.119 - Cellulitis of 

unspecified part of limb





Condition: Stable


Disposition: HOME, SELF-CARE


Additional Instructions: 


You were seen today in the emergency department for rash to her hands.  Your 

rash is most likely due to contact dermatitis, which is an allergic reaction to 

something that has touched her skin.  You are being started on antibiotics to 

help with the redness.  You are also being started on steroids to help with 

inflammation.  You are being prescribed Pepcid to help with the inflammation.  

Please follow-up with your primary care provider in regards to this visit.  You 

can take over-the-counter Benadryl 25 to 50 mg every 4-6 hours as needed for 

itchiness.  Continue the Cetaphil lotion.


Prescriptions: 


Prednisone [Deltasone 20 mg Tablet] 3 tab PO DAILY 5 Days  tablet


Cephalexin Monohydrate [Keflex 500 mg Capsule] 500 mg PO Q6H 7 Days #28 capsule


Famotidine [Pepcid 20 mg Tablet] 20 mg PO BID #12 tablet


Forms:  Return to Work


Referrals: 


PERRI DAHL FNP-C [[employer] BASED STAFF] - Follow up as needed -Unknown etiology, based on urine studies likely 2/2 SIADH as patient's urine has urine osm>100 and urine Na>20  Resolved- sodium is 140  - no longer trending BMP